# Patient Record
Sex: FEMALE | Race: WHITE | NOT HISPANIC OR LATINO
[De-identification: names, ages, dates, MRNs, and addresses within clinical notes are randomized per-mention and may not be internally consistent; named-entity substitution may affect disease eponyms.]

---

## 2021-11-17 ENCOUNTER — NON-APPOINTMENT (OUTPATIENT)
Age: 51
End: 2021-11-17

## 2021-11-22 ENCOUNTER — NON-APPOINTMENT (OUTPATIENT)
Age: 51
End: 2021-11-22

## 2021-11-23 ENCOUNTER — APPOINTMENT (OUTPATIENT)
Dept: OTOLARYNGOLOGY | Facility: CLINIC | Age: 51
End: 2021-11-23
Payer: COMMERCIAL

## 2021-11-23 VITALS — BODY MASS INDEX: 26.67 KG/M2 | TEMPERATURE: 98 F | HEIGHT: 68 IN | WEIGHT: 176 LBS

## 2021-11-23 DIAGNOSIS — Z78.9 OTHER SPECIFIED HEALTH STATUS: ICD-10-CM

## 2021-11-23 DIAGNOSIS — Z86.39 PERSONAL HISTORY OF OTHER ENDOCRINE, NUTRITIONAL AND METABOLIC DISEASE: ICD-10-CM

## 2021-11-23 DIAGNOSIS — R49.0 DYSPHONIA: ICD-10-CM

## 2021-11-23 DIAGNOSIS — K26.4 CHRONIC OR UNSPECIFIED DUODENAL ULCER WITH HEMORRHAGE: ICD-10-CM

## 2021-11-23 DIAGNOSIS — E21.0 PRIMARY HYPERPARATHYROIDISM: ICD-10-CM

## 2021-11-23 DIAGNOSIS — H81.10 BENIGN PAROXYSMAL VERTIGO, UNSPECIFIED EAR: ICD-10-CM

## 2021-11-23 DIAGNOSIS — E21.3 HYPERPARATHYROIDISM, UNSPECIFIED: ICD-10-CM

## 2021-11-23 PROCEDURE — 31575 DIAGNOSTIC LARYNGOSCOPY: CPT

## 2021-11-23 PROCEDURE — 99203 OFFICE O/P NEW LOW 30 MIN: CPT | Mod: 25

## 2021-11-24 PROBLEM — R49.0 HOARSENESS: Status: ACTIVE | Noted: 2021-11-24

## 2021-11-24 PROBLEM — E21.0 HYPERPARATHYROIDISM, PRIMARY: Status: ACTIVE | Noted: 2021-11-24

## 2021-11-24 PROBLEM — E21.3 HYPERPARATHYROIDISM: Status: ACTIVE | Noted: 2021-11-24

## 2021-11-24 NOTE — ASSESSMENT
[FreeTextEntry1] : She has symptomatic primary hyperparathyroidism.\par I explained that the only treatment is surgery.\par There is no medical option for her.\par \par I did request that she get a copy of her previous thyroid surgery pathology report. to make sure that has not been any inadvertent removal of left parathyroid glands.\par I further explained that even in the setting of not been surgically removed there is still some chance that the left glands are not functioning properly.\par The relevance is that if the adenoma is successfully resected there is a risk of developing hypoparathyroidism.\par \par I did explain that the operation would be planned ambulatory, though we would observe to make sure she does not develop hypocalcemia.\par I discussed the risks which included but were not limited to general anesthesia, infection, bleeding, transit or permanent hoarseness (we operate with laryngeal nerve monitoring), transit or permanent inability for high pitch or screaming.\par We also discussed the possibility of a negative exploration.\par \par She will be sent for medical clearance.\par \par I will review her Ct scan with my Head and Neck radiology colleagues.\par At this time I did not see an indication for further work up.

## 2021-11-24 NOTE — HISTORY OF PRESENT ILLNESS
[de-identified] : Initial visit.\par She reports that she recently presented to her PCP with a constellation of systemic symptoms.\par She had new onset of weight gain, constipation, feeling foggy, low-grade specific pain, and fatigue.\par She has a known history of Hashimoto Thyroiditis and takes thyroid replacement.\par She did have left thyroid lobectomy in the late 1990's for a cold nodule that she reports was benign.\par \par She was found to have hypercalcemia ( 10.1, 10.5 and 10.9)\par She has been taking Vit. D for years.\par She does have a slightly elevated Vit D 1,25 level.\par She had a recent intact PTH of 110.7, range of assay ( 18.5-88).\par \par She had a recent 4D- parathryoid ct scan that I reviewed and appreciate that she is s/p left thyroid lobectomy with a focal soft tissue abnormality intimate with the right lobe thyroid that is posteriorly and superiorly neat the TE groove.\par

## 2021-11-24 NOTE — PROCEDURE
[de-identified] : PROCEDURE: Flexible laryngoscopy\par SURGEON: Dr. Casillas\par INDICATIONS: He was unable to tolerate a mirror exam. Assess for laryngopharynx pharyngeal reflux. cough. head and neck mass. \par ANESTHESIA: The patient was placed in a sitting position.  Following application of the topical anesthetic and decongestant, exam was performed with a flexible scope.  The scope was passed along the right nasal floor to the nasopharynx.  It was then passed into the region of the middle meatus, middle turbinate, and sphenoethmoid region.  An identical procedure was performed on the left side.  The following findings were noted:\par \par The nasal musoca was healthy appearing and the septum was roughly midline. The middle meatus and sphenoethmoid recesses were clear bilaterally. The nasopharynx \par \par Nasopharynx: no masses, choanae patent, no adenoid tissue\par \par Base of tongue/vallecula: no masses or asymmetry\par Pharyngeal walls: symmetrical. No masses.\par Pyriform sinuses: no lesions or pooling of secretions.\par Epiglottis: normal. No edema or lesions.\par Aryepiglottic folds: normal. No lesions. \par Vocal cords: clear and mobile. No lesions. Airway patent.\par Arytenoids: no edema or erythema. \par Interarytenoid area: no edema, erythema or lesion.\par

## 2021-12-07 ENCOUNTER — TRANSCRIPTION ENCOUNTER (OUTPATIENT)
Age: 51
End: 2021-12-07

## 2021-12-07 VITALS
TEMPERATURE: 98 F | OXYGEN SATURATION: 99 % | WEIGHT: 175.71 LBS | DIASTOLIC BLOOD PRESSURE: 86 MMHG | HEART RATE: 76 BPM | SYSTOLIC BLOOD PRESSURE: 138 MMHG | HEIGHT: 68 IN | RESPIRATION RATE: 16 BRPM

## 2021-12-07 RX ORDER — ASCORBIC ACID 60 MG
1 TABLET,CHEWABLE ORAL
Qty: 0 | Refills: 0 | DISCHARGE

## 2021-12-07 RX ORDER — LEVOTHYROXINE SODIUM 125 MCG
1 TABLET ORAL
Qty: 0 | Refills: 0 | DISCHARGE

## 2021-12-07 RX ORDER — MESALAMINE 400 MG
2 TABLET, DELAYED RELEASE (ENTERIC COATED) ORAL
Qty: 0 | Refills: 0 | DISCHARGE

## 2021-12-07 RX ORDER — CHOLECALCIFEROL (VITAMIN D3) 125 MCG
1 CAPSULE ORAL
Qty: 0 | Refills: 0 | DISCHARGE

## 2021-12-07 NOTE — ASU PATIENT PROFILE, ADULT - INTERNATIONAL TRAVEL
Detail Level: Simple Price (Do Not Change): 0.00 Instructions: This plan will send the code FBSE to the PM system.  DO NOT or CHANGE the price. No

## 2021-12-07 NOTE — ASU PATIENT PROFILE, ADULT - AS SC BRADEN FRICTION
(3) no apparent problem 

## 2021-12-07 NOTE — ASU PATIENT PROFILE, ADULT - FALL HARM RISK - UNIVERSAL INTERVENTIONS
Bed in lowest position, wheels locked, appropriate side rails in place/Call bell, personal items and telephone in reach/Instruct patient to call for assistance before getting out of bed or chair/Non-slip footwear when patient is out of bed/[place] to call system/Physically safe environment - no spills, clutter or unnecessary equipment/Purposeful Proactive Rounding/Room/bathroom lighting operational, light cord in reach

## 2021-12-07 NOTE — ASU PATIENT PROFILE, ADULT - NSICDXPASTMEDICALHX_GEN_ALL_CORE_FT
PAST MEDICAL HISTORY:  Disorder of thyroid     History of colitis     HLD (hyperlipidemia)     Hypothyroidism     Psoriasis

## 2021-12-08 ENCOUNTER — RESULT REVIEW (OUTPATIENT)
Age: 51
End: 2021-12-08

## 2021-12-08 ENCOUNTER — TRANSCRIPTION ENCOUNTER (OUTPATIENT)
Age: 51
End: 2021-12-08

## 2021-12-08 ENCOUNTER — APPOINTMENT (OUTPATIENT)
Dept: OTOLARYNGOLOGY | Facility: AMBULATORY SURGERY CENTER | Age: 51
End: 2021-12-08

## 2021-12-08 ENCOUNTER — OUTPATIENT (OUTPATIENT)
Dept: OUTPATIENT SERVICES | Facility: HOSPITAL | Age: 51
LOS: 1 days | Discharge: HOME | End: 2021-12-08
Payer: COMMERCIAL

## 2021-12-08 VITALS
SYSTOLIC BLOOD PRESSURE: 132 MMHG | OXYGEN SATURATION: 97 % | TEMPERATURE: 98 F | DIASTOLIC BLOOD PRESSURE: 81 MMHG | RESPIRATION RATE: 26 BRPM | HEART RATE: 91 BPM

## 2021-12-08 DIAGNOSIS — Z98.890 OTHER SPECIFIED POSTPROCEDURAL STATES: Chronic | ICD-10-CM

## 2021-12-08 DIAGNOSIS — E89.0 POSTPROCEDURAL HYPOTHYROIDISM: Chronic | ICD-10-CM

## 2021-12-08 LAB
CALCIUM SERPL-MCNC: 10.3 MG/DL — SIGNIFICANT CHANGE UP (ref 8.4–10.5)
PTH-INTACT IO % DIF SERPL: 100.8 PG/ML — HIGH (ref 15–65)
PTH-INTACT IO % DIF SERPL: 15.1 PG/ML — SIGNIFICANT CHANGE UP (ref 15–65)
PTH-INTACT IO % DIF SERPL: 19.3 PG/ML — SIGNIFICANT CHANGE UP (ref 15–65)
PTH-INTACT IO % DIF SERPL: 22.7 PG/ML — SIGNIFICANT CHANGE UP (ref 15–65)
PTH-INTACT IO % DIF SERPL: 67.7 PG/ML — HIGH (ref 15–65)

## 2021-12-08 PROCEDURE — 86850 RBC ANTIBODY SCREEN: CPT

## 2021-12-08 PROCEDURE — 88305 TISSUE EXAM BY PATHOLOGIST: CPT | Mod: 26

## 2021-12-08 PROCEDURE — 88331 PATH CONSLTJ SURG 1 BLK 1SPC: CPT

## 2021-12-08 PROCEDURE — 60500 EXPLORE PARATHYROID GLANDS: CPT

## 2021-12-08 PROCEDURE — C1889: CPT

## 2021-12-08 PROCEDURE — 86900 BLOOD TYPING SEROLOGIC ABO: CPT

## 2021-12-08 PROCEDURE — 36415 COLL VENOUS BLD VENIPUNCTURE: CPT

## 2021-12-08 PROCEDURE — 83970 ASSAY OF PARATHORMONE: CPT

## 2021-12-08 PROCEDURE — 82310 ASSAY OF CALCIUM: CPT

## 2021-12-08 PROCEDURE — 88331 PATH CONSLTJ SURG 1 BLK 1SPC: CPT | Mod: 26

## 2021-12-08 PROCEDURE — 86901 BLOOD TYPING SEROLOGIC RH(D): CPT

## 2021-12-08 PROCEDURE — 88305 TISSUE EXAM BY PATHOLOGIST: CPT

## 2021-12-08 RX ORDER — ACETAMINOPHEN 500 MG
1000 TABLET ORAL EVERY 6 HOURS
Refills: 0 | Status: DISCONTINUED | OUTPATIENT
Start: 2021-12-08 | End: 2021-12-08

## 2021-12-08 RX ORDER — MORPHINE SULFATE 50 MG/1
2 CAPSULE, EXTENDED RELEASE ORAL EVERY 8 HOURS
Refills: 0 | Status: DISCONTINUED | OUTPATIENT
Start: 2021-12-08 | End: 2021-12-08

## 2021-12-08 RX ORDER — ACETAMINOPHEN 500 MG
31.25 TABLET ORAL
Qty: 0 | Refills: 0 | DISCHARGE
Start: 2021-12-08

## 2021-12-08 RX ORDER — ONDANSETRON 8 MG/1
2 TABLET, FILM COATED ORAL EVERY 6 HOURS
Refills: 0 | Status: DISCONTINUED | OUTPATIENT
Start: 2021-12-08 | End: 2021-12-08

## 2021-12-08 RX ORDER — SODIUM CHLORIDE 9 MG/ML
1000 INJECTION, SOLUTION INTRAVENOUS
Refills: 0 | Status: DISCONTINUED | OUTPATIENT
Start: 2021-12-08 | End: 2021-12-08

## 2021-12-08 NOTE — BRIEF OPERATIVE NOTE - NSICDXBRIEFPREOP_GEN_ALL_CORE_FT
PRE-OP DIAGNOSIS:  Parathyroid hyperplasia 08-Dec-2021 13:44:22  Rod Ramey  Parathyroid hyperplasia 08-Dec-2021 13:44:27  Rod Ramey

## 2021-12-08 NOTE — DISCHARGE NOTE NURSING/CASE MANAGEMENT/SOCIAL WORK - PATIENT PORTAL LINK FT
You can access the FollowMyHealth Patient Portal offered by [facility] by registering at the following website: [email or web address]. By joining [facility] FollowMyHealth portal, you will also be able to view your health information using other applications (apps) compatible with our system.

## 2021-12-08 NOTE — ASU DISCHARGE PLAN (ADULT/PEDIATRIC) - ASU DC SPECIAL INSTRUCTIONSFT
1. DO not lift anything heavier than 5 lbs  2. Call Dr. Casillas if you develop numbness or tingling around the mouth or in the finger tips.  3. Call office for follow up appointment time  4. Showering allowed. DO not shower directly to incision.  5. Keep dressing on until seen by Dr. Casillas

## 2021-12-08 NOTE — DISCHARGE NOTE NURSING/CASE MANAGEMENT/SOCIAL WORK - NSDCPEFALRISK_GEN_ALL_CORE
For information on Fall & Injury Prevention, visit: [email or web address] OR  [email or web address] OR  https://www.cdc.gov/steadi/patient.html

## 2021-12-08 NOTE — ASU DISCHARGE PLAN (ADULT/PEDIATRIC) - CARE PROVIDER_API CALL
Ronald Casillas)  Otolaryngology  [address], [address]  [place] [address]  Phone: [phone]  Fax: [phone]  Follow Up Time:

## 2021-12-09 PROBLEM — E03.9 HYPOTHYROIDISM, UNSPECIFIED: Chronic | Status: ACTIVE | Noted: 2021-12-07

## 2021-12-09 PROBLEM — E07.9 DISORDER OF THYROID, UNSPECIFIED: Chronic | Status: ACTIVE | Noted: 2021-12-07

## 2021-12-09 PROBLEM — E78.5 HYPERLIPIDEMIA, UNSPECIFIED: Chronic | Status: ACTIVE | Noted: 2021-12-07

## 2021-12-09 PROBLEM — L40.9 PSORIASIS, UNSPECIFIED: Chronic | Status: ACTIVE | Noted: 2021-12-07

## 2021-12-09 PROBLEM — Z87.19 PERSONAL HISTORY OF OTHER DISEASES OF THE DIGESTIVE SYSTEM: Chronic | Status: ACTIVE | Noted: 2021-12-07

## 2021-12-10 LAB — SURGICAL PATHOLOGY STUDY: SIGNIFICANT CHANGE UP

## 2021-12-16 ENCOUNTER — APPOINTMENT (OUTPATIENT)
Dept: OTOLARYNGOLOGY | Facility: CLINIC | Age: 51
End: 2021-12-16
Payer: COMMERCIAL

## 2021-12-16 DIAGNOSIS — E89.2 POSTPROCEDURAL HYPOPARATHYROIDISM: ICD-10-CM

## 2021-12-16 PROCEDURE — 99024 POSTOP FOLLOW-UP VISIT: CPT

## 2021-12-16 NOTE — REASON FOR VISIT
[de-identified] : 8 [de-identified] : Status post focal parathyroidectomy. Identified a 0.4 g upper right lesion.\par In general she reports that she feels much better in fact is not having any headaches.\par \par Her voice is clear.\par The wound is well healed.\par Wound care was discussed.\par She is chemically cleared in fact she had some blood work last week but demonstrated her calcium levels to be around 9 and her parathyroid hormone range to be in the normal range.\par \par She has an endocrinology should continue to follow her.\par Silicon cream for 6 months was recommended.\par fu prn.

## 2022-07-21 NOTE — ASU PREOP CHECKLIST - SITE MARKED BY ANESTHESIOLOGIST
Pt was seen at the  this morning for a CTA abdomen, pelvis. CT showed bilateral blood clots in lungs and DVT in both legs, right worse than left. Advised to come to ED for further workup. Pt denies chest pain, sob at this time. Pt reports pain to right leg mostly      Triage Assessment     Row Name 07/21/22 1227       Peripheral/Neurovascular WDL    Peripheral Neurovascular WDL X               n/a

## 2024-06-17 NOTE — ASU DISCHARGE PLAN (ADULT/PEDIATRIC) - NS MD DC FALL RISK RISK
For information on Fall & Injury Prevention, visit: [email or web address] OR  [email or web address] OR  https://www.cdc.gov/steadi/patient.html There are no Wet Read(s) to document.

## 2024-12-02 NOTE — BRIEF OPERATIVE NOTE - ASSISTANT(S)
[FreeTextEntry1] : 1. Thyroid Cancer 2. Postsurgical Hypothyroidism Surgery: Total thyroidectomy, left lateral neck dissection 8/23/2023, Dr. Mo Kumar Multifocal PTC, infiltrative follicular variant. Left PTC 2.2 cm, right PTC 0.5 cm. Lymphatic invasion present. No angioinvasion, no ETE, margins negative, no increased mitoses or tumor necrosis. 3/25 LN positive for metastatic disease, left level 2 and level 3, largest 1.5 cm, no BERNADINE. 2015 HOLGER Risk: Intermediate AJCC 8th edition TNM Stage: [id number], Stage I HINDS Treatment: 101.4 mCi 11/3/2023  Surveillance: HINDS treatment 101.4 mCi on 11/3/2023. Posttherapy scan with iodine avid right thyroid bed remnant and left level 4 iodine avid lateral compartment disease, no distant iodine avid disease. Thyroglobulin <0.2 with negative TgAb, most recently 11/25/2024. Neck US 4/25/24 with 2.2 cm probable right thyroid remnant, no suspicious cervical LN. Neck US 11/8/2024: L[facility]E  PLAN: -She is status post total thyroidectomy/lymph node dissection and HINDS treatment.  We discussed usual surveillance of thyroid cancer posttreatment, with serial thyroglobulin and neck ultrasounds. -Check thyroglobulin every 6 to 12 months if continuing to maintain excellent biochemical response -Can switch to levothyroxine 88 mcg daily on all 7 days of the week, maintain TSH <2.  Check TFTs for dose adjustment every 6 to 12 months. -Neck US unremarkable from 11/2024.  Can repeat in 1 year and if continues to have excellent response, can decrease frequency of subsequent ultrasounds.  RTC in 6 months with previsit labs.  Anette Buck MD [facility] at [address], [address] Ph: [phone] Fax: [phone]. Kyler MD